# Patient Record
Sex: FEMALE | Race: BLACK OR AFRICAN AMERICAN | NOT HISPANIC OR LATINO | Employment: FULL TIME | URBAN - METROPOLITAN AREA
[De-identification: names, ages, dates, MRNs, and addresses within clinical notes are randomized per-mention and may not be internally consistent; named-entity substitution may affect disease eponyms.]

---

## 2021-06-02 ENCOUNTER — RECORDS - HEALTHEAST (OUTPATIENT)
Dept: ADMINISTRATIVE | Facility: CLINIC | Age: 31
End: 2021-06-02

## 2022-02-03 ENCOUNTER — OFFICE VISIT (OUTPATIENT)
Dept: FAMILY MEDICINE | Facility: CLINIC | Age: 32
End: 2022-02-03

## 2022-02-03 VITALS
DIASTOLIC BLOOD PRESSURE: 79 MMHG | SYSTOLIC BLOOD PRESSURE: 124 MMHG | RESPIRATION RATE: 20 BRPM | HEART RATE: 68 BPM | TEMPERATURE: 98.2 F | OXYGEN SATURATION: 99 %

## 2022-02-03 DIAGNOSIS — J45.901 EXACERBATION OF ASTHMA, UNSPECIFIED ASTHMA SEVERITY, UNSPECIFIED WHETHER PERSISTENT: Primary | ICD-10-CM

## 2022-02-03 DIAGNOSIS — R06.02 SHORTNESS OF BREATH: ICD-10-CM

## 2022-02-03 DIAGNOSIS — R05.9 COUGH: ICD-10-CM

## 2022-02-03 PROCEDURE — U0003 INFECTIOUS AGENT DETECTION BY NUCLEIC ACID (DNA OR RNA); SEVERE ACUTE RESPIRATORY SYNDROME CORONAVIRUS 2 (SARS-COV-2) (CORONAVIRUS DISEASE [COVID-19]), AMPLIFIED PROBE TECHNIQUE, MAKING USE OF HIGH THROUGHPUT TECHNOLOGIES AS DESCRIBED BY CMS-2020-01-R: HCPCS | Mod: 90 | Performed by: NURSE PRACTITIONER

## 2022-02-03 PROCEDURE — 99000 SPECIMEN HANDLING OFFICE-LAB: CPT | Performed by: NURSE PRACTITIONER

## 2022-02-03 PROCEDURE — 99204 OFFICE O/P NEW MOD 45 MIN: CPT | Performed by: NURSE PRACTITIONER

## 2022-02-03 PROCEDURE — U0005 INFEC AGEN DETEC AMPLI PROBE: HCPCS | Mod: 90 | Performed by: NURSE PRACTITIONER

## 2022-02-03 RX ORDER — ALBUTEROL SULFATE 90 UG/1
2 AEROSOL, METERED RESPIRATORY (INHALATION) ONCE
Status: COMPLETED | OUTPATIENT
Start: 2022-02-03 | End: 2022-02-03

## 2022-02-03 RX ORDER — ALBUTEROL SULFATE 90 UG/1
2 AEROSOL, METERED RESPIRATORY (INHALATION) EVERY 6 HOURS PRN
Qty: 18 G | Refills: 0 | Status: SHIPPED | OUTPATIENT
Start: 2022-02-03 | End: 2022-09-26

## 2022-02-03 RX ORDER — PREDNISONE 20 MG/1
40 TABLET ORAL DAILY
Qty: 10 TABLET | Refills: 0 | Status: SHIPPED | OUTPATIENT
Start: 2022-02-03 | End: 2022-02-08

## 2022-02-03 RX ADMIN — ALBUTEROL SULFATE 2 PUFF: 90 INHALANT RESPIRATORY (INHALATION) at 11:37

## 2022-02-03 ASSESSMENT — ENCOUNTER SYMPTOMS
SHORTNESS OF BREATH: 1
WHEEZING: 1
COUGH: 1
CHILLS: 0
MYALGIAS: 0
FEVER: 0
CHEST TIGHTNESS: 1

## 2022-02-03 NOTE — LETTER
[address]  [facility] [address]  Phone: [phone]  Fax: [phone]    February 3, 2022        Luis Martínez  [facility] [address]          To whom it may concern:    RE: Luis Martínez    Patient was seen and treated today at our clinic and missed work.    Please contact me for questions or concerns.      Sincerely,        Marietta Bates, CNP

## 2022-02-03 NOTE — PATIENT INSTRUCTIONS
Recheck in 1-2 weeks at primary care.    Use inhaler up to 4 times daily as needed     If you need it more than 4 times, you probably need prednisone (come back or call).        Patient Education     Using an Inhaler with a Spacer  To control asthma, you need to use your medicines the right way. Some medicines are inhaled using a device called a metered-dose inhaler (MDI). MDIs deliver medicine with a fine spray. Your healthcare provider has prescribed a special chamber or spacer to use with your inhaler. A spacer increases the amount of medicine that goes to your lungs. It may make your medicine work better.   Steps for using an inhaler with a spacer  Step 1:    Wash your hands.    Check the expiration date and the counter of the inhaler, if present.    Remove the cap from the inhaler.    Shake the inhaler well. If the inhaler is being used for the first time or has not been used for a while, prime it as directed by the product maker. Make sure to prime the inhaler in the air away from your face.    Check to make sure the metal canister is put correctly into the plastic boot, or rajan of the inhaler. See the package insert for instructions.    Attach the spacer to the inhaler. Then remove the cap from the spacer mouthpiece.  Step 2:    Breathe out normally, away from the spacer.    Put the mouthpiece of the spacer past your teeth and above your tongue. Close your lips tightly around it. If you are using a spacer with a mask, make sure the mask covers your nose and mouth with a good seal against your chin and cheeks. Make sure there is no space between your skin and the mask.    Keep your chin up or level.  Step 3:    Press the canister on the inhaler 1 time to release the medicine    Then breathe in through your mouth as slowly and deeply as you can. This should take about 5 to 10  seconds. If you breathe too quickly, you may hear a whistling sound in certain spacers.  Step 4:    Take the spacer mouthpiece out of  your mouth. Close your lips.    Hold your breath up to 10 seconds if you are able.    Then hold your lips together and slowly breathe out through your mouth.    After using your inhaler, rinse your mouth with water by swishing, gargling, and spitting out the water. Never swallow it. Inhaled corticosteroids can cause a fungal infection called thrush in your mouth and throat.        If you re prescribed more than 1 puff of medicine at a time, wait up to 60 seconds, or as directed by your healthcare provider, between puffs. This number may be different for different medicines. Shake the inhaler again. Then repeat steps 2 to 4.   Rev[employer] last reviewed this educational content on 5/1/2019 2000-2021 [employer]. All rights reserved. This information is not intended as a substitute for professional medical care. Always follow your healthcare professional's instructions.           Patient Education     Asthma (Adult)   Asthma is a disease where the medium and small air passages in the lung go into spasm and restrict air flow. Inflammation and swelling of the airways cause further blockage. During an acute asthma attack, these factors cause trouble breathing, wheezing, cough, and chest tightness.     An asthma attack can be triggered by many things. Common triggers include infections such as the common cold, bronchitis, and pneumonia. Irritants such as smoke or pollutants in the air, very cold air, emotional upset, and exercise can also trigger an attack. In many adults with asthma, allergies to dust, mold, pollen, and animal dander can cause an asthma attack. Skipping doses of daily asthma medicine can also bring on an asthma attack.   Asthma can be controlled using the correct medicines prescribed by your healthcare provider and staying away from known triggers including allergens and irritants.   Home care    Take prescribed medicine exactly at the times advised. If you need medicine such as from a  handheld inhaler or aerosol breathing machine more than every 4 hours, contact your healthcare provider or get medical care right away. If you are prescribed an antibiotic or prednisone, take all of the medicine as prescribed. Keep taking it even if you are feeling better after a few days.    Don't smoke. Stay away from the smoke of others.    Some people with asthma find their symptoms get worse when they take aspirin and non-steroidal or fever-reducing medicines such as ibuprofen and naproxen. Talk with your healthcare provider if you think this may apply to you.    Follow-up care  Follow up with your healthcare provider, or as advised. Always bring all of your current medicines to any appointments with your healthcare provider. Also bring a complete list of medicines, even those not taken for asthma. If you don't already have one, talk with your healthcare provider about making your own Asthma Action Plan.   A pneumonia (pneumococcal) vaccine and yearly flu shot (every fall) are advised. Ask your provider about this.   When to get medical advice  Call your healthcare provider or get medical care right away if any of these occur:      More wheezing or shortness of breath    Need to use your inhalers more often than normal without relief    Fever of 100.4 F (38 C) or higher, or as directed by your provider    Coughing up lots of dark-colored or bloody sputum (mucus)    Chest pain with each breath    If you use a peak flow meter as part of an Asthma Action Plan, and you are still in the yellow zone (50% to 80%) 15 minutes after using inhaler medicine.  Call 911  Call 911 if any of these occur:     Trouble walking or talking because you are short of breath    If you use a peak flow meter as part of an Asthma Action Plan, and you are still in the red zone (less than 50%) 15 minutes after using inhaler medicine    Lips or fingernails turn gray, purple, or blue    Feeling faint or loss of consciousness  [employer] last  reviewed this educational content on 10/1/2019    6181-9383 [employer]. All rights reserved. This information is not intended as a substitute for professional medical care. Always follow your healthcare professional's instructions.

## 2022-02-03 NOTE — PROGRESS NOTES
Assessment & Plan     Exacerbation of asthma, unspecified asthma severity, unspecified whether persistent    - albuterol (PROVENTIL HFA/VENTOLIN HFA) inhaler  - AEROCHAMBER  - albuterol (PROAIR HFA/PROVENTIL HFA/VENTOLIN HFA) 108 (90 Base) MCG/ACT inhaler  Dispense: 18 g; Refill: 0  - predniSONE (DELTASONE) 20 MG tablet  Dispense: 10 tablet; Refill: 0    Moderate persistent asthma      Cough    - Symptomatic; Yes; 2/1/2022 COVID-19 Virus (Coronavirus) by PCR Nose     Patient with typical asthma exacerbation symptoms.  Was out of her albuterol when this started.  Patient recovered well with 2 albuterol puffs with a spacer given in the clinic.  Taught how to use spacer.  Patient's wheezing had resolved by the time I checked her.  She says she is feeling much better.    Patient does not feel she needs prednisone.  Gave her a paper prescription to fill if she is using her albuterol more than 4 times daily to persistent wheezing.    Has no PCP.  Told to establish care in the next couple of weeks for recheck.  Should come back if worsening despite use of prednisone and albuterol.    Respiratory rate improved from 28-to 20 prior to discharge.            No follow-ups on file.    Marietta Bates [facility] is a 31 year old female who presents to clinic today for the following health issues:  Chief Complaint   Patient presents with     Breathing Problem     Asthma has been triggered and wheezing x 9:30AM today, out of inhaler, coughing is triggered from the asthma     HPI    Coughing, wheezing for 2 days.  Clear phlegm.  No ill contacts.      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      Rec'd albuterol when she arrived - feeling a little better.     Has been out of inhalers x 1 weeks.  Would use albuterol occasionally.     Hasn't used controllers in years.               Review of Systems   Constitutional: Negative for chills and fever.   HENT: Negative for congestion.     Respiratory: Positive for cough, chest tightness, shortness of breath and wheezing.    Musculoskeletal: Negative for myalgias.           Objective    /79 (BP Location: Right arm)   Pulse 68   Temp 98.2  F (36.8  C) (Tympanic)   Resp 20   SpO2 99%   Physical Exam  Constitutional:       General: She is not in acute distress.     Appearance: She is well-developed.   HENT:      Nose: No congestion.   Eyes:      General:         Right eye: No discharge.         Left eye: No discharge.      Conjunctiva/sclera: Conjunctivae normal.   Cardiovascular:      Rate and Rhythm: Normal rate and regular rhythm.      Pulses: Normal pulses.      Heart sounds: Normal heart sounds.   Pulmonary:      Effort: Pulmonary effort is normal.      Breath sounds: Normal breath sounds. No wheezing.   Musculoskeletal:         General: Normal range of motion.   Skin:     General: Skin is warm and dry.      Capillary Refill: Capillary refill takes less than 2 seconds.   Neurological:      Mental Status: She is alert and oriented to person, place, and time.   Psychiatric:         Mood and Affect: Mood normal.         Behavior: Behavior normal.         Thought Content: Thought content normal.         Judgment: Judgment normal.

## 2022-02-04 LAB — SARS-COV-2 RNA RESP QL NAA+PROBE: NOT DETECTED

## 2022-09-26 ENCOUNTER — HOSPITAL ENCOUNTER (EMERGENCY)
Facility: HOSPITAL | Age: 32
Discharge: HOME | End: 2022-09-26
Admitting: PHYSICIAN ASSISTANT

## 2022-09-26 VITALS
DIASTOLIC BLOOD PRESSURE: 57 MMHG | TEMPERATURE: 98.3 F | WEIGHT: 199 LBS | RESPIRATION RATE: 28 BRPM | OXYGEN SATURATION: 100 % | SYSTOLIC BLOOD PRESSURE: 117 MMHG | HEART RATE: 84 BPM | BODY MASS INDEX: 34.16 KG/M2

## 2022-09-26 DIAGNOSIS — J45.901 EXACERBATION OF ASTHMA, UNSPECIFIED ASTHMA SEVERITY, UNSPECIFIED WHETHER PERSISTENT: ICD-10-CM

## 2022-09-26 DIAGNOSIS — J20.9 ACUTE BRONCHITIS: ICD-10-CM

## 2022-09-26 LAB
FLUAV RNA SPEC QL NAA+PROBE: NEGATIVE
FLUBV RNA RESP QL NAA+PROBE: NEGATIVE
RSV RNA SPEC NAA+PROBE: POSITIVE
SARS-COV-2 RNA RESP QL NAA+PROBE: NEGATIVE

## 2022-09-26 PROCEDURE — 99284 EMERGENCY DEPT VISIT MOD MDM: CPT | Mod: 25

## 2022-09-26 PROCEDURE — C9803 HOPD COVID-19 SPEC COLLECT: HCPCS

## 2022-09-26 PROCEDURE — 250N000009 HC RX 250: Performed by: PHYSICIAN ASSISTANT

## 2022-09-26 PROCEDURE — 999N000157 HC STATISTIC RCP TIME EA 10 MIN

## 2022-09-26 PROCEDURE — 87637 SARSCOV2&INF A&B&RSV AMP PRB: CPT | Performed by: EMERGENCY MEDICINE

## 2022-09-26 PROCEDURE — 94640 AIRWAY INHALATION TREATMENT: CPT

## 2022-09-26 RX ORDER — ALBUTEROL SULFATE 90 UG/1
2 AEROSOL, METERED RESPIRATORY (INHALATION) EVERY 6 HOURS PRN
Qty: 18 G | Refills: 1 | Status: SHIPPED | OUTPATIENT
Start: 2022-09-26

## 2022-09-26 RX ORDER — IPRATROPIUM BROMIDE AND ALBUTEROL SULFATE 2.5; .5 MG/3ML; MG/3ML
1 SOLUTION RESPIRATORY (INHALATION) EVERY 6 HOURS PRN
Qty: 90 ML | Refills: 0 | Status: SHIPPED | OUTPATIENT
Start: 2022-09-26

## 2022-09-26 RX ORDER — IPRATROPIUM BROMIDE AND ALBUTEROL SULFATE 2.5; .5 MG/3ML; MG/3ML
3 SOLUTION RESPIRATORY (INHALATION) ONCE
Status: COMPLETED | OUTPATIENT
Start: 2022-09-26 | End: 2022-09-26

## 2022-09-26 RX ORDER — PREDNISONE 20 MG/1
TABLET ORAL
Qty: 10 TABLET | Refills: 0 | Status: SHIPPED | OUTPATIENT
Start: 2022-09-26

## 2022-09-26 RX ADMIN — IPRATROPIUM BROMIDE AND ALBUTEROL SULFATE 3 ML: .5; 3 SOLUTION RESPIRATORY (INHALATION) at 10:52

## 2022-09-26 ASSESSMENT — ENCOUNTER SYMPTOMS
FEVER: 0
CHILLS: 0
ABDOMINAL PAIN: 0
COUGH: 1
SHORTNESS OF BREATH: 1

## 2022-09-26 ASSESSMENT — ACTIVITIES OF DAILY LIVING (ADL): ADLS_ACUITY_SCORE: 35

## 2022-09-26 NOTE — Clinical Note
Luis Martínez was seen and treated in our emergency department on 9/26/2022.  She may return to work on 09/27/2022.       If you have any questions or concerns, please don't hesitate to call.      Chadwick Melgoza PA-C

## 2022-09-26 NOTE — ED TRIAGE NOTES
Patient presents to ED with 4 days of shortness of breath, wheezing and chest pain when she coughs. Does not have access to her rescue inhaler and ran out of her nebulizer med. Hx of asthma.    Audible wheezes on arrival.

## 2022-09-26 NOTE — ED PROVIDER NOTES
EMERGENCY DEPARTMENT ENCOUNTER      NAME: Luis Martínez  AGE: 32 year old female  YOB: 1990  MRN: [id number]  EVALUATION DATE & TIME: No admission date for patient encounter.    PCP: No Ref-Primary, Physician    ED PROVIDER: Chadwick Melgoza PA-C      Chief Complaint   Patient presents with     Shortness of Breath     Wheezing         FINAL IMPRESSION:  1. Acute bronchitis    2. Exacerbation of asthma, unspecified asthma severity, unspecified whether persistent          MEDICAL DECISION MAKING:    Pertinent Labs & Imaging studies reviewed. (See chart for details)  32 year old female presents to the Emergency Department for evaluation of cough and shortness of breath with history of asthma and smoking.    After obtaining history present illness, reviewing vital signs and examining the patient I suspect acute bronchitis as a likely diagnosis.    Prior to discharging the patient nursing staff with paperwork informing that she was having more wheezing.  I did reexamine the patient and now on during my physical examination there is expiratory wheezes noted in all lung fields.  Patient continues to appear nontoxic and in no significant distress, we will treat with a neb treatment prior to disposition but then continue with our plan of care of steroids, refill of her inhaler, and refill of her neb solution for home treatment.    Wheezing completely cleared after neb treatment here, patient is prepared to discharged home.      ED COURSE  9:59 AM  I met with the patient, obtained history, performed an initial exam, and discussed options and plan for diagnostics and treatment here in the ED.  11:19 AM I reassessed patient.    At the conclusion of the encounter I discussed the results of all of the tests and the disposition. The questions were answered. The patient or family acknowledged understanding and was agreeable with the care plan.     MEDICATIONS GIVEN IN THE EMERGENCY:  Medications   ipratropium -  albuterol 0.5 mg/2.5 mg/3 mL (DUONEB) neb solution 3 mL (3 mLs Nebulization Given 9/26/22 1052)       NEW PRESCRIPTIONS STARTED AT TODAY'S ER VISIT  New Prescriptions    IPRATROPIUM - ALBUTEROL 0.5 MG/2.5 MG/3 ML (DUONEB) 0.5-2.5 (3) MG/3ML NEB SOLUTION    Take 1 vial (3 mLs) by nebulization every 6 hours as needed for shortness of breath / dyspnea or wheezing    PREDNISONE (DELTASONE) 20 MG TABLET    Take two tablets (= 40mg) each day for 5 (five) days            =================================================================    HPI    Patient information was obtained from: Patient    Use of Interpretor: N/A       Luis NEGRO Mauricio is a 32 year old female with a pertinent history of asthma who presents to this ED via walk-in for evaluation of shortness of breath, wheezing.    Patient endorses ongoing shortness of breath and a non productive cough for 4 days, she also states she ran out of her rescue inhaler 4 days ago. Patient is a smoker, but has not smoked for the last 4 days. Patient denies any history of blood clots or clotting disorders. Patient works at a  and requests a covid test for work.     Patient denies recent trauma, denies fever, chills, abdominal pain, chest pain, and all other relevant symptoms.      REVIEW OF SYSTEMS   Review of Systems   Constitutional: Negative for chills and fever.   Respiratory: Positive for cough (Non productive) and shortness of breath.    Cardiovascular: Negative for chest pain.   Gastrointestinal: Negative for abdominal pain.   All other systems reviewed and are negative.         PAST MEDICAL HISTORY:  Past Medical History:   Diagnosis Date     Uncomplicated asthma        PAST SURGICAL HISTORY:  History reviewed. No pertinent surgical history.      CURRENT MEDICATIONS:    No current facility-administered medications for this encounter.    Current Outpatient Medications:      albuterol (PROAIR HFA/PROVENTIL HFA/VENTOLIN HFA) 108 (90 Base) MCG/ACT inhaler, Inhale 2  puffs into the lungs every 6 hours as needed for shortness of breath / dyspnea or wheezing, Disp: 18 g, Rfl: 1     ipratropium - albuterol 0.5 mg/2.5 mg/3 mL (DUONEB) 0.5-2.5 (3) MG/3ML neb solution, Take 1 vial (3 mLs) by nebulization every 6 hours as needed for shortness of breath / dyspnea or wheezing, Disp: 90 mL, Rfl: 0     predniSONE (DELTASONE) 20 MG tablet, Take two tablets (= 40mg) each day for 5 (five) days, Disp: 10 tablet, Rfl: 0     beclomethasone (QVAR) 40 mcg/actuation inhaler, [BECLOMETHASONE (QVAR) 40 MCG/ACTUATION INHALER] Inhale 1 puff 2 (two) times a day. (Patient not taking: Reported on 2/3/2022), Disp: 1 Inhaler, Rfl: 12     fluticasone-salmeterol (ADVAIR) 100-50 mcg/dose DISKUS, [FLUTICASONE-SALMETEROL (ADVAIR) 100-50 MCG/DOSE DISKUS] Inhale 1 puff 2 (two) times a day. (Patient not taking: Reported on 2/3/2022), Disp: 1 each, Rfl: 1     ibuprofen (ADVIL,MOTRIN) 600 MG tablet, [IBUPROFEN (ADVIL,MOTRIN) 600 MG TABLET] Take 1 tablet (600 mg total) by mouth every 6 (six) hours as needed. (Patient not taking: Reported on 2/3/2022), Disp: 28 tablet, Rfl: 0      ALLERGIES:  No Known Allergies    FAMILY HISTORY:  Family History   Problem Relation Age of Onset     No Known Problems Mother      No Known Problems Father      No Known Problems Brother      Diabetes Maternal Grandmother      Heart Disease Maternal Grandmother      Hypertension Maternal Grandmother      Cerebrovascular Disease Paternal Grandmother        SOCIAL HISTORY:   Social History     Socioeconomic History     Marital status: Single   Tobacco Use     Smoking status: Never Smoker     Smokeless tobacco: Never Used   Substance and Sexual Activity     Alcohol use: Yes     Comment: Alcoholic Drinks/day: occ       Drug use: No     Sexual activity: Yes     Partners: Male       VITALS:  Patient Vitals for the past 24 hrs:   BP Temp Temp src Pulse Resp SpO2 Weight   09/26/22 0936 117/57 98.3  F (36.8  C) Temporal 84 28 100 % 90.3 kg (199 lb)        PHYSICAL EXAM    Physical Exam  Vitals and nursing note reviewed.   Constitutional:       General: She is not in acute distress.     Appearance: She is normal weight. She is not ill-appearing or toxic-appearing.   HENT:      Head: Normocephalic.      Right Ear: External ear normal.      Left Ear: External ear normal.   Eyes:      Conjunctiva/sclera: Conjunctivae normal.   Cardiovascular:      Rate and Rhythm: Normal rate.      Pulses: Normal pulses.   Pulmonary:      Effort: Pulmonary effort is normal. No respiratory distress.      Breath sounds: No rhonchi or rales.      Comments: Original examination did not reveal any significant wheezing, clear lung sounds noted in all lung fields.    Prior to disposition, nursing staff reported that she was seeming to show more evidence of wheezing.  I did reexamine the patient now she does have diffuse expiratory wheezes noted throughout but overall still moving air well and not appearing in significant respiratory distress.  Musculoskeletal:         General: Normal range of motion.      Cervical back: Normal range of motion.      Right lower leg: No edema.      Left lower leg: No edema.   Skin:     General: Skin is dry.   Neurological:      General: No focal deficit present.      Mental Status: She is alert. Mental status is at baseline.      Sensory: No sensory deficit.      Motor: No weakness.          LAB:  All pertinent labs reviewed and interpreted.       RADIOLOGY:  Reviewed all pertinent imaging. Please see official radiology report.  No orders to display         Tru MCGRATH, am serving as a scribe to document services personally performed by Chadwick Melgoza PA-C based on my observation and the provider's statements to me. IChadwick PA-C attest that Tru Rodriguez is acting in a scribe capacity, has observed my performance of the services and has documented them in accordance with my direction.    Chadwick Melgoza PA-C  Emergency Medicine  [facility]       Chadwick Melgoza PA-C  09/26/22 1125

## 2022-09-26 NOTE — DISCHARGE INSTRUCTIONS
At this point time your vital signs are all normal.  Pulmonary examination is benign.  I suspect acute bronchitis as a cause of your shortness of breath and chest discomfort and cough.  I did not feel that emergent work-up was needed in the form of labs or imaging, because of your working at a  we will screen with COVID testing, and these results can be followed on MyChart.

## 2024-09-03 PROCEDURE — 99284 EMERGENCY DEPT VISIT MOD MDM: CPT | Mod: 25

## 2024-09-03 ASSESSMENT — COLUMBIA-SUICIDE SEVERITY RATING SCALE - C-SSRS
6. HAVE YOU EVER DONE ANYTHING, STARTED TO DO ANYTHING, OR PREPARED TO DO ANYTHING TO END YOUR LIFE?: NO
1. IN THE PAST MONTH, HAVE YOU WISHED YOU WERE DEAD OR WISHED YOU COULD GO TO SLEEP AND NOT WAKE UP?: NO
2. HAVE YOU ACTUALLY HAD ANY THOUGHTS OF KILLING YOURSELF IN THE PAST MONTH?: NO

## 2024-09-04 ENCOUNTER — HOSPITAL ENCOUNTER (EMERGENCY)
Facility: CLINIC | Age: 34
Discharge: HOME | End: 2024-09-04
Attending: EMERGENCY MEDICINE | Admitting: EMERGENCY MEDICINE

## 2024-09-04 ENCOUNTER — APPOINTMENT (OUTPATIENT)
Dept: CT IMAGING | Facility: CLINIC | Age: 34
End: 2024-09-04
Attending: EMERGENCY MEDICINE

## 2024-09-04 VITALS
HEIGHT: 64 IN | BODY MASS INDEX: 37.08 KG/M2 | TEMPERATURE: 98.2 F | WEIGHT: 217.2 LBS | HEART RATE: 75 BPM | RESPIRATION RATE: 18 BRPM | OXYGEN SATURATION: 98 % | SYSTOLIC BLOOD PRESSURE: 122 MMHG | DIASTOLIC BLOOD PRESSURE: 82 MMHG

## 2024-09-04 DIAGNOSIS — L03.90 CELLULITIS, UNSPECIFIED CELLULITIS SITE: ICD-10-CM

## 2024-09-04 DIAGNOSIS — R10.9 ABDOMINAL PAIN, UNSPECIFIED ABDOMINAL LOCATION: ICD-10-CM

## 2024-09-04 LAB
ALBUMIN SERPL BCG-MCNC: 4 G/DL (ref 3.5–5.2)
ALBUMIN UR-MCNC: NEGATIVE MG/DL
ALP SERPL-CCNC: 55 U/L (ref 40–150)
ALT SERPL W P-5'-P-CCNC: 7 U/L (ref 0–50)
ANION GAP SERPL CALCULATED.3IONS-SCNC: 12 MMOL/L (ref 7–15)
APPEARANCE UR: CLEAR
AST SERPL W P-5'-P-CCNC: 16 U/L (ref 0–45)
BACTERIA #/AREA URNS HPF: ABNORMAL /HPF
BASOPHILS # BLD AUTO: 0 10E3/UL (ref 0–0.2)
BASOPHILS NFR BLD AUTO: 1 %
BILIRUB SERPL-MCNC: <0.2 MG/DL
BILIRUB UR QL STRIP: NEGATIVE
BUN SERPL-MCNC: 7.5 MG/DL (ref 6–20)
CALCIUM SERPL-MCNC: 8.6 MG/DL (ref 8.8–10.4)
CHLORIDE SERPL-SCNC: 106 MMOL/L (ref 98–107)
COLOR UR AUTO: ABNORMAL
CREAT SERPL-MCNC: 0.93 MG/DL (ref 0.51–0.95)
EGFRCR SERPLBLD CKD-EPI 2021: 82 ML/MIN/1.73M2
EOSINOPHIL # BLD AUTO: 0.3 10E3/UL (ref 0–0.7)
EOSINOPHIL NFR BLD AUTO: 5 %
ERYTHROCYTE [DISTWIDTH] IN BLOOD BY AUTOMATED COUNT: 13.2 % (ref 10–15)
GLUCOSE SERPL-MCNC: 88 MG/DL (ref 70–99)
GLUCOSE UR STRIP-MCNC: NEGATIVE MG/DL
HCG SERPL QL: NEGATIVE
HCO3 SERPL-SCNC: 26 MMOL/L (ref 22–29)
HCT VFR BLD AUTO: 37.9 % (ref 35–47)
HGB BLD-MCNC: 12.5 G/DL (ref 11.7–15.7)
HGB UR QL STRIP: ABNORMAL
IMM GRANULOCYTES # BLD: 0 10E3/UL
IMM GRANULOCYTES NFR BLD: 0 %
KETONES UR STRIP-MCNC: NEGATIVE MG/DL
LEUKOCYTE ESTERASE UR QL STRIP: NEGATIVE
LIPASE SERPL-CCNC: 75 U/L (ref 13–60)
LYMPHOCYTES # BLD AUTO: 2.8 10E3/UL (ref 0.8–5.3)
LYMPHOCYTES NFR BLD AUTO: 49 %
MCH RBC QN AUTO: 29.5 PG (ref 26.5–33)
MCHC RBC AUTO-ENTMCNC: 33 G/DL (ref 31.5–36.5)
MCV RBC AUTO: 89 FL (ref 78–100)
MONOCYTES # BLD AUTO: 0.6 10E3/UL (ref 0–1.3)
MONOCYTES NFR BLD AUTO: 10 %
MUCOUS THREADS #/AREA URNS LPF: PRESENT /LPF
NEUTROPHILS # BLD AUTO: 2 10E3/UL (ref 1.6–8.3)
NEUTROPHILS NFR BLD AUTO: 36 %
NITRATE UR QL: POSITIVE
NRBC # BLD AUTO: 0 10E3/UL
NRBC BLD AUTO-RTO: 0 /100
PH UR STRIP: 5.5 [PH] (ref 5–7)
PLAT MORPH BLD: NORMAL
PLATELET # BLD AUTO: 292 10E3/UL (ref 150–450)
POTASSIUM SERPL-SCNC: 4.1 MMOL/L (ref 3.4–5.3)
PROT SERPL-MCNC: 6.7 G/DL (ref 6.4–8.3)
RBC # BLD AUTO: 4.24 10E6/UL (ref 3.8–5.2)
RBC MORPH BLD: NORMAL
RBC URINE: 1 /HPF
SODIUM SERPL-SCNC: 144 MMOL/L (ref 135–145)
SP GR UR STRIP: 1.01 (ref 1–1.03)
SQUAMOUS EPITHELIAL: 1 /HPF
UROBILINOGEN UR STRIP-MCNC: <2 MG/DL
WBC # BLD AUTO: 5.7 10E3/UL (ref 4–11)
WBC URINE: 2 /HPF

## 2024-09-04 PROCEDURE — 81001 URINALYSIS AUTO W/SCOPE: CPT | Performed by: EMERGENCY MEDICINE

## 2024-09-04 PROCEDURE — 87086 URINE CULTURE/COLONY COUNT: CPT | Performed by: EMERGENCY MEDICINE

## 2024-09-04 PROCEDURE — 84703 CHORIONIC GONADOTROPIN ASSAY: CPT | Performed by: EMERGENCY MEDICINE

## 2024-09-04 PROCEDURE — 87186 SC STD MICRODIL/AGAR DIL: CPT | Performed by: EMERGENCY MEDICINE

## 2024-09-04 PROCEDURE — 83690 ASSAY OF LIPASE: CPT | Performed by: EMERGENCY MEDICINE

## 2024-09-04 PROCEDURE — 80053 COMPREHEN METABOLIC PANEL: CPT | Performed by: EMERGENCY MEDICINE

## 2024-09-04 PROCEDURE — 85025 COMPLETE CBC W/AUTO DIFF WBC: CPT | Performed by: EMERGENCY MEDICINE

## 2024-09-04 PROCEDURE — 36415 COLL VENOUS BLD VENIPUNCTURE: CPT | Performed by: EMERGENCY MEDICINE

## 2024-09-04 PROCEDURE — 74176 CT ABD & PELVIS W/O CONTRAST: CPT

## 2024-09-04 RX ORDER — OXYCODONE AND ACETAMINOPHEN 5; 325 MG/1; MG/1
1 TABLET ORAL EVERY 6 HOURS PRN
Qty: 8 TABLET | Refills: 0 | Status: SHIPPED | OUTPATIENT
Start: 2024-09-04 | End: 2024-09-07

## 2024-09-04 ASSESSMENT — ACTIVITIES OF DAILY LIVING (ADL)
ADLS_ACUITY_SCORE: 35

## 2024-09-04 NOTE — Clinical Note
Luis Martínez was seen and treated in our emergency department on 9/3/2024.  She may return to work on 09/05/2024.       If you have any questions or concerns, please don't hesitate to call.      Morteza Amado MD

## 2024-09-04 NOTE — DISCHARGE INSTRUCTIONS
Ice off-and-on to sore areas can help with pain.  Over-the-counter Tylenol or ibuprofen as tolerated every 6 hours can help with pain.  1 Percocet every 6 hours instead only if needed for stronger pain.  Augmentin as prescribed.  Wash leg area with soap and water twice daily.  See your doctor or clinic for follow-up within the next week.  See them sooner if worse or problems.

## 2024-09-04 NOTE — ED PROVIDER NOTES
EMERGENCY DEPARTMENT ENCOUNTER      NAME: Luis Martínez  AGE: 34 year old female  YOB: 1990  MRN: [id number]  EVALUATION DATE & TIME: 2024  1:30 AM    PCP: No Ref-Primary, Physician    ED PROVIDER: Morteza Amado M.D.      Chief Complaint   Patient presents with    Abdominal Pain    Insect Bite                FINAL IMPRESSION:  1.  Acute abdominal pain.  2.  Acute left thigh bug bite cellulitis.      ED COURSE & MEDICAL DECISION MAKIN:22 AM I met with the patient to gather history and to perform my initial exam. We discussed plans for the ED course, including diagnostic testing and treatment. PPE worn: cloth mask.  Patient with diffuse abdominal pain off-and-on for a number of months increased over this weekend.  Pain is diffuse.  She also notes some sort of a bug bite, unwitnessed, to the left lateral thigh and now has a 2 inch diameter red and warm to the touch which is slightly itchy.  No central clearing.  Findings consistent with a bug bite cellulitis.  5:32 AM.  No acute findings in abdomen CT.  Negative urine pregnancy test.  Negative CBC and chemistries and LFTs.  Lipase mildly elevated but the pancreas is normal on CT.  Patient will be discharged home on pain medications and antibiotic for her bug bite cellulitis.  Patient in agreement with the plan.    Pertinent Labs & Imaging studies reviewed. (See chart for details)  34 year old female presents to the Emergency Department for evaluation of abdominal pain and bug bite cellulitis.    At the conclusion of the encounter I discussed the results of all of the tests and the disposition. The questions were answered. The patient or family acknowledged understanding and was agreeable with the care plan.              Medical Decision Making  Reviewed external records: Both inpatient and outpatient computer records were reviewed.  Care impacted by chronic illness: Asthma, urinary tract infection  Care significantly affected by social  determinants of health:Access to Medical Care  Did you consider but not order tests?: Work up considered but not performed and documented in chart, if applicable  Did you interpret images independently?: Independent interpretation of ECG and images noted in documentation, when applicable.  Consultation discussion with other provider:Did you involve another provider (consultant, MH, pharmacy, etc.)?: No  Anticipated discharge home after evaluation.          MEDICATIONS GIVEN IN THE EMERGENCY:  Medications - No data to display    NEW PRESCRIPTIONS STARTED AT TODAY'S ER VISIT  New Prescriptions    No medications on file          =================================================================    HPI    Patient information was obtained from: The patient.    Use of : N/A       Luis SRUTHI Martínez is a 34 year old female with a pertinent history of asthma who presents to this ED today for evaluation of diffuse abdominal pain off and on for a number of months.  Worse over the weekend.  Additionally some sort of a bug bite to the left lateral thigh.  She not certain what insect it was that she now has a 2 inch diameter area of redness and itching.  No central clearing.    She does not identify any waxing or waning symptoms otherwise, exacerbating or alleviating features, associated symptoms except as mentioned.  She otherwise denies any pain related complaints.    REVIEW OF SYSTEMS   Review of Systems patient complaining of left thigh cellulitis and abdominal pain.  No other complaints or symptoms at this time other than mild nausea.    PAST MEDICAL HISTORY:  Past Medical History:   Diagnosis Date    Uncomplicated asthma        PAST SURGICAL HISTORY:  History reviewed. No pertinent surgical history.        CURRENT MEDICATIONS:    albuterol (PROAIR HFA/PROVENTIL HFA/VENTOLIN HFA) 108 (90 Base) MCG/ACT inhaler  beclomethasone (QVAR) 40 mcg/actuation inhaler  fluticasone-salmeterol (ADVAIR) 100-50 mcg/dose  "DISKUS  ibuprofen (ADVIL,MOTRIN) 600 MG tablet  ipratropium - albuterol 0.5 mg/2.5 mg/3 mL (DUONEB) 0.5-2.5 (3) MG/3ML neb solution  predniSONE (DELTASONE) 20 MG tablet        ALLERGIES:  No Known Allergies    FAMILY HISTORY:  Family History   Problem Relation Age of Onset    No Known Problems Mother     No Known Problems Father     No Known Problems Brother     Diabetes Maternal Grandmother     Heart Disease Maternal Grandmother     Hypertension Maternal Grandmother     Cerebrovascular Disease Paternal Grandmother        SOCIAL HISTORY:   Social History     Socioeconomic History    Marital status: Single     Spouse name: None    Number of children: None    Years of education: None    Highest education level: None   Tobacco Use    Smoking status: Never    Smokeless tobacco: Never   Substance and Sexual Activity    Alcohol use: Yes     Comment: Alcoholic Drinks/day: occ      Drug use: No    Sexual activity: Yes     Partners: Male     Occasional alcohol.  No drugs or tobacco.  VITALS:  /82   Pulse 64   Temp 98.2  F (36.8  C) (Temporal)   Resp 18   Ht 1.626 m (5' 4\")   Wt 98.5 kg (217 lb 3.2 oz)   LMP 08/19/2024 (Approximate)   SpO2 98%   BMI 37.28 kg/m      PHYSICAL EXAM    Vital Signs:  /82   Pulse 64   Temp 98.2  F (36.8  C) (Temporal)   Resp 18   Ht 1.626 m (5' 4\")   Wt 98.5 kg (217 lb 3.2 oz)   LMP 08/19/2024 (Approximate)   SpO2 98%   BMI 37.28 kg/m    General:  On entering the room she is in no apparent distress.    Neck:  Neck supple with full range of motion and nontender.    Back:  Back and spine are nontender.  No costovertebral angle tenderness.    HEENT:  Oropharynx clear with moist mucous membranes.  HEENT unremarkable.    Pulmonary:  Chest clear to auscultation without rhonchi rales or wheezing.    Cardiovascular:  Cardiac regular rate and rhythm without murmurs rubs or gallops.    Abdomen:  Abdomen soft and mildly diffusely tender.  There is no rebound or guarding.  "   Muskuloskeletal:  She moves all 4 without any difficulty and has normal neurovascular exams.  Extremities without clubbing, cyanosis, or edema.  Legs and calves are nontender.    Neuro:  She is alert and oriented ×3 and moves all extremities symmetrically.    Psych:  Normal affect.    Skin:  Unremarkable and warm and dry.  2 inch diameter redness and minimal tenderness to the lateral left thigh.  No central clearing.  No abscess.       LAB:  All pertinent labs reviewed and interpreted.  Labs Ordered and Resulted from Time of ED Arrival to Time of ED Departure   ROUTINE UA WITH MICROSCOPIC REFLEX TO CULTURE - Abnormal       Result Value    Color Urine Light Yellow      Appearance Urine Clear      Glucose Urine Negative      Bilirubin Urine Negative      Ketones Urine Negative      Specific Gravity Urine 1.010      Blood Urine 0.06 mg/dL (*)     pH Urine 5.5      Protein Albumin Urine Negative      Urobilinogen Urine <2.0      Nitrite Urine Positive (*)     Leukocyte Esterase Urine Negative      Bacteria Urine Many (*)     Mucus Urine Present (*)     RBC Urine 1      WBC Urine 2      Squamous Epithelials Urine 1     COMPREHENSIVE METABOLIC PANEL - Abnormal    Sodium 144      Potassium 4.1      Carbon Dioxide (CO2) 26      Anion Gap 12      Urea Nitrogen 7.5      Creatinine 0.93      GFR Estimate 82      Calcium 8.6 (*)     Chloride 106      Glucose 88      Alkaline Phosphatase 55      AST 16      ALT 7      Protein Total 6.7      Albumin 4.0      Bilirubin Total <0.2     LIPASE - Abnormal    Lipase 75 (*)    HCG QUALITATIVE PREGNANCY - Normal    hCG Serum Qualitative Negative     CBC WITH PLATELETS AND DIFFERENTIAL    WBC Count 5.7      RBC Count 4.24      Hemoglobin 12.5      Hematocrit 37.9      MCV 89      MCH 29.5      MCHC 33.0      RDW 13.2      Platelet Count 292      NRBCs per 100 WBC 0      Absolute NRBCs 0.0     RBC AND PLATELET MORPHOLOGY   URINE CULTURE       RADIOLOGY:  Reviewed all pertinent imaging.  Please see official radiology report.  Abd/pelvis CT no contrast - Stone Protocol   Final Result   IMPRESSION:    1.  No acute abnormality. No cause of pain is evident.                    EKG:          PROCEDURES:         I, Elaine Mcallister, am serving as a scribe to document services personally performed by Dr. Amado based on my observation and the provider's statements to me. I, Morteza Amado MD attest that Elaine Mcallister is acting in a scribe capacity, has observed my performance of the services and has documented them in accordance with my direction.    Morteza Amado M.D.  Emergency Medicine  [facility] EMERGENCY ROOM  [facility] [facility] [id number] [phone]  Dept: [phone]       Morteza Amado MD  09/04/24 0532

## 2024-09-04 NOTE — ED TRIAGE NOTES
Patient arrives to the ER with c/o abdominal pain. She has been having abdominal pain for the past few months, but this past weekend, the pain worsened to the point where she was not able to get out of bed. Patient states that she had a bowel movement and she described it as mucous-like. Also endorses nausea. She also was bit by a bug yesterday on her left outer thigh and today she noticed it red and swollen.      Triage Assessment (Adult)       Row Name 09/03/24 2507          Triage Assessment    Airway WDL WDL        Respiratory WDL    Respiratory WDL WDL        Skin Circulation/Temperature WDL    Skin Circulation/Temperature WDL X  bug bite yesterday, bullseye redness to L outer thigh        Cardiac WDL    Cardiac WDL WDL        Peripheral/Neurovascular WDL    Peripheral Neurovascular WDL WDL        Cognitive/Neuro/Behavioral WDL    Cognitive/Neuro/Behavioral WDL WDL

## 2024-09-05 LAB — BACTERIA UR CULT: ABNORMAL

## 2024-09-06 ENCOUNTER — TELEPHONE (OUTPATIENT)
Dept: NURSING | Facility: CLINIC | Age: 34
End: 2024-09-06

## 2024-09-06 RX ORDER — CEPHALEXIN 500 MG/1
500 CAPSULE ORAL 4 TIMES DAILY
Qty: 28 CAPSULE | Refills: 0 | Status: SHIPPED | OUTPATIENT
Start: 2024-09-06 | End: 2024-09-13

## 2024-09-06 NOTE — ED NOTES
10:31 AM I spoke with the results nurse regarding positive urine culture. Upon chart review, patient was prescribed Augmentin for suspected cellulitis. The urine culture showed the bacteria was resistant to Augmentin and is requesting a change in antibiotic. I will prescribe the patient Keflex as the urine culture showed the bacteria was susceptible to this and will also help with the cellulitis. Keflex was sent to patient's pharmacy.     Jacki Kuhn PA-C  09/06/24 1030

## 2024-09-06 NOTE — TELEPHONE ENCOUNTER
[facility]     Reason for call: Lab Result Notification     Lab Result (including Rx patient on, if applicable).  If culture, copy of lab report at bottom.  Lab Result: Urine culture  amoxicillin-clavulanate (AUGMENTIN) 875-125 MG tablet 2 times daily for 7 days for cellulitis  Creatinine Level (mg/dl)   Creatinine   Date Value Ref Range Status   09/04/2024 0.93 0.51 - 0.95 mg/dL Final    Creatinine clearance (ml/min), if applicable    Serum creatinine: 0.93 mg/dL 09/04/24 0306  Estimated creatinine clearance: 97.2 mL/min     Patient's current Symptoms:   Reports right flank pain and bad smelling urine. ,     RN Recommendations/Instructions per [place] ED lab result protocol:   RN will consult with ED provider.    [facility] Emergency Department Provider: Jacki DAI  Consultation Time: 4226  Provider Recommendation:  Stop Augmentin and start Keflex  Patient/parent notified of Providers recommendations (YES/NO): yes  Patient/care giver notified to contact your PCP clinic or return to the Emergency department if your:  Symptoms return.  Symptoms do not improve after 3 days on antibiotic.  Symptoms do not resolve after completing antibiotic.  Symptoms worsen or other concerning symptoms.    Marietta Baltazar, RN

## 2024-09-16 ENCOUNTER — APPOINTMENT (OUTPATIENT)
Dept: ADMINISTRATIVE | Facility: CLINIC | Age: 34
End: 2024-09-16

## 2024-09-18 ENCOUNTER — HOSPITAL ENCOUNTER (EMERGENCY)
Facility: CLINIC | Age: 34
Discharge: HOME | End: 2024-09-18
Attending: FAMILY MEDICINE | Admitting: FAMILY MEDICINE

## 2024-09-18 VITALS
WEIGHT: 217 LBS | HEART RATE: 73 BPM | SYSTOLIC BLOOD PRESSURE: 131 MMHG | TEMPERATURE: 98.4 F | RESPIRATION RATE: 16 BRPM | OXYGEN SATURATION: 98 % | BODY MASS INDEX: 37.25 KG/M2 | DIASTOLIC BLOOD PRESSURE: 74 MMHG

## 2024-09-18 DIAGNOSIS — J45.20 MILD INTERMITTENT ASTHMA WITHOUT COMPLICATION: ICD-10-CM

## 2024-09-18 DIAGNOSIS — R06.09 DOE (DYSPNEA ON EXERTION): ICD-10-CM

## 2024-09-18 PROCEDURE — 99283 EMERGENCY DEPT VISIT LOW MDM: CPT

## 2024-09-18 PROCEDURE — 250N000013 HC RX MED GY IP 250 OP 250 PS 637: Performed by: STUDENT IN AN ORGANIZED HEALTH CARE EDUCATION/TRAINING PROGRAM

## 2024-09-18 RX ORDER — ALBUTEROL SULFATE 90 UG/1
2 AEROSOL, METERED RESPIRATORY (INHALATION) ONCE
Status: COMPLETED | OUTPATIENT
Start: 2024-09-18 | End: 2024-09-18

## 2024-09-18 RX ORDER — ALBUTEROL SULFATE 5 MG/ML
2.5 SOLUTION RESPIRATORY (INHALATION) EVERY 6 HOURS PRN
Status: DISCONTINUED | OUTPATIENT
Start: 2024-09-18 | End: 2024-09-18

## 2024-09-18 RX ORDER — ALBUTEROL SULFATE 90 UG/1
2 AEROSOL, METERED RESPIRATORY (INHALATION) EVERY 6 HOURS PRN
Qty: 18 G | Refills: 0 | Status: SHIPPED | OUTPATIENT
Start: 2024-09-18

## 2024-09-18 RX ADMIN — ALBUTEROL SULFATE 2 PUFF: 90 AEROSOL, METERED RESPIRATORY (INHALATION) at 12:47

## 2024-09-18 ASSESSMENT — COLUMBIA-SUICIDE SEVERITY RATING SCALE - C-SSRS
2. HAVE YOU ACTUALLY HAD ANY THOUGHTS OF KILLING YOURSELF IN THE PAST MONTH?: NO
6. HAVE YOU EVER DONE ANYTHING, STARTED TO DO ANYTHING, OR PREPARED TO DO ANYTHING TO END YOUR LIFE?: NO
1. IN THE PAST MONTH, HAVE YOU WISHED YOU WERE DEAD OR WISHED YOU COULD GO TO SLEEP AND NOT WAKE UP?: NO

## 2024-09-18 ASSESSMENT — ACTIVITIES OF DAILY LIVING (ADL)
ADLS_ACUITY_SCORE: 35

## 2024-09-18 NOTE — ED PROVIDER NOTES
Emergency Department Midlevel Supervisory Note     I had a face to face encounter with this patient seen by the Advanced Practice Provider (ALFRED). I personally made/approved the management plan and take responsibility for the patient management. I personally saw patient and performed a substantive portion of the visit including all aspects of the medical decision making.     ED Course:  12:17 PM  Huma Chris PA-C staffed patient with me. I agree with their assessment and plan of management, and I will see the patient.  12:21 PM  I met with the patient to introduce myself, gather additional history, perform my initial exam, and discuss the plan.     Procedures:  I was present for the key portions of procedures documented in ALFRED/midlevel note, see midlevel note for further details.    MDM:  Patient with history of asthma, had some shortness of breath and wheezing today and was sent to the emergency department.  On exam here, denies any symptoms.  No chest pain.  Low risk by Wells criteria and PERC negative, no indication for D-dimer or CT.  Patient says that these symptoms are consistent with prior exercise-induced asthma episodes.  We discussed further testing including EKG, chest x-ray, and labs.  Patient would like to defer this for now.  Patient will be given albuterol inhaler discharge      1. BARKER (dyspnea on exertion)    2. Mild intermittent asthma without complication           Brief HPI:     Luis Martínez is a 34 year old female who presents for evaluation of shortness of breath.    Brief Physical Exam: /74   Pulse 73   Temp 98.4  F (36.9  C) (Oral)   Resp 16   Wt 98.4 kg (217 lb)   LMP 08/19/2024 (Approximate)   SpO2 98%   BMI 37.25 kg/m    Physical Exam  Vitals and nursing note reviewed.   Constitutional:       Appearance: Normal appearance.   HENT:      Head: Normocephalic and atraumatic.      Right Ear: External ear normal.      Left Ear: External ear normal.      Nose: Nose normal.       Mouth/Throat:      Mouth: Mucous membranes are moist.   Eyes:      Extraocular Movements: Extraocular movements intact.      Conjunctiva/sclera: Conjunctivae normal.      Pupils: Pupils are equal, round, and reactive to light.   Cardiovascular:      Rate and Rhythm: Normal rate and regular rhythm.   Pulmonary:      Effort: Pulmonary effort is normal.      Breath sounds: Normal breath sounds. No wheezing or rales.   Abdominal:      General: Abdomen is flat. There is no distension.      Palpations: Abdomen is soft.      Tenderness: There is no abdominal tenderness. There is no guarding.   Musculoskeletal:         General: Normal range of motion.      Cervical back: Normal range of motion and neck supple.      Right lower leg: No edema.      Left lower leg: No edema.   Lymphadenopathy:      Cervical: No cervical adenopathy.   Skin:     General: Skin is warm and dry.   Neurological:      General: No focal deficit present.      Mental Status: She is alert and oriented to person, place, and time. Mental status is at baseline.      Comments: No gross focal neurologic deficits   Psychiatric:         Mood and Affect: Mood normal.         Behavior: Behavior normal.         Thought Content: Thought content normal.       Joseph Nicholson M.D.  [facility] EMERGENCY ROOM  [address] [facility] [id number] [phone]     Joseph Nicholson MD  09/18/24 8341

## 2024-09-18 NOTE — DISCHARGE INSTRUCTIONS
You were seen in the emergency department today for mild asthma exacerbation.    You were given a prescription for an albuterol inhaler here in the ER, I recommend keeping this handy to use for any shortness of breath and wheezing.  You can also try taking a daily Zyrtec or Claritin to see if this helps with seasonal changes in your asthma.    Continue working on getting insurance so that you can establish with primary care for further management of your asthma.  Return to the emergency department if you develop any severe worsening shortness of breath or chest pain, wheezing, or high fevers

## 2024-09-18 NOTE — Clinical Note
Luis Martínez was seen and treated in our emergency department on 9/18/2024.  She may return to work on 09/18/2024.  Can return to work without restrictions     If you have any questions or concerns, please don't hesitate to call.      Huma Chris PA-C

## 2024-09-18 NOTE — ED TRIAGE NOTES
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  She began to have chest tightness.  She does have a hx of asthma.  Currently denies CP or SOB and states her director wanted her to be seen in ER.     Triage Assessment (Adult)       Row Name 09/18/24 1210          Triage Assessment    Airway WDL WDL        Respiratory WDL    Respiratory WDL WDL        Skin Circulation/Temperature WDL    Skin Circulation/Temperature WDL WDL        Cardiac WDL    Cardiac WDL WDL        Peripheral/Neurovascular WDL    Peripheral Neurovascular WDL WDL        Cognitive/Neuro/Behavioral WDL    Cognitive/Neuro/Behavioral WDL WDL

## 2024-09-18 NOTE — ED PROVIDER NOTES
Emergency Department Encounter   NAME: Luis Martínez ; AGE: 34 year old female ; YOB: 1990 ; MRN: [id number] ; PCP: No Ref-Primary, Physician   ED PROVIDER: Huma Chris PA-C    Evaluation Date & Time:   No admission date for patient encounter.    CHIEF COMPLAINT:  Chest Pain and Shortness of Breath        Impression and Plan   FINAL IMPRESSION:    ICD-10-CM    1. BARKER (dyspnea on exertion)  R06.09       2. Mild intermittent asthma without complication  J45.20           ED Course and Medical Decision Making  Luis is a 34 year old female with PMH of migraine headaches, moderate persistent asthma, and PCOS presenting to the ER for evaluation of an episode of chest pain and SOB. Patient states she does not have any symptoms currently. She states every time seasons change she gets increased wheezing and shortness of breath related to her asthma.  Patient works as a  and was playing outside with kids, when she came inside to get water she was a bit wheezy with shortness of breath.  She states her director saw her, who has never met her before and was very concerned, sending her to the emergency department.  Patient states she cannot return to work until she has a note saying she is cleared. She states this episode felt similar to her asthma exacerbations in the past, denies any fevers, cough, congestion, or sore throat.  Patient states she has not been seen by primary care for a few years and does not currently have a rescue albuterol inhaler.    Vitals reviewed and remarkable, she is afebrile temp 98.4 F.  She is not tachycardic or tachypneic.  SpO2 98% on room air.  On exam she is resting comfortably, no distress. Differential diagnosis includes but not limited to COVID, influenza, viral URI, asthma exacerbation, pneumonia, PE, ACS.  Her lung sounds are clear to auscultation throughout, no wheezing, rhonchi, or stridor.  No accessory muscle use or increased work of breathing.   Patient is speaking in full sentences without difficulty.  She is completely asymptomatic at this time and does not to appear to be having an acute asthma exacerbation.  Do not think there is indication for steroids or magnesium. Patient states the primary reason she is here today is because her boss made her come, she has not personally concerned that the episode that happened earlier today is anything but her asthma. Given she has dyspnea on exertion I did offer further cardiac workup today.  Patient is fairly confident that the symptoms she experienced earlier today are due to her asthma.  Utilizing shared decision making, decided to not proceed with further cardiac workup today.  I think this is reasonable given her benign exam, stable vitals, and is otherwise low risk for cardiac disease.  She is PERC negative and I have low suspicion for PE as a cause for her transient shortness of breath and chest tightness.    She was given 2 puffs of albuterol here in the ER.  Given her symptoms seem to be exacerbated by season changes I recommended she try daily Zyrtec seasonally to see if this helps.  I have also given her a refill of her albuterol inhaler.  Patient states she is currently in the process of getting insurance and will establish primary care to continue management of her asthma.  She was educated on concerning symptoms of warrant return to the emergency department and is understanding and agreeable to this plan.        History:  Obtained supplemental history:Supplemental history obtained?: No  Reviewed external records: External records reviewed?: No    Complicating Factors:  Care impacted by chronic illness:N/A  Care significantly affected by social determinants of health:Access to Medical Care    Work Up:  Did you consider but not order tests?: In addition to work-up documented, I considered the following work up: EKG, troponin, CXR, CBC, BMP, TSH, COVID swab  Did you interpret images independently?:  Independent interpretation of ECG and images noted in documentation, when applicable.    External Consults:  Consultation discussion with other provider: Dr. Nicholson  Discharge. I prescribed additional prescription strength medication(s) as charted. See documentation for any additional details.        ED COURSE:  12:10 PM I met and introduced myself to the patient. I gathered initial history and performed my physical exam. We discussed plan for initial workup.   12:17 PM I have staffed the patient with Dr. Nicholson, ED MD, who has evaluated the patient and agrees with all aspects of today's care.   12:30 PM I rechecked the patient and discussed results, discharge, follow up, and reasons to return to the ED.     At the conclusion of the encounter I discussed the results of all the tests and the disposition. The questions were answered. The patient or family acknowledged understanding and was agreeable with the care plan.        MEDICATIONS GIVEN IN THE EMERGENCY DEPARTMENT:  Medications   albuterol (PROVENTIL HFA/VENTOLIN HFA) inhaler (has no administration in time range)         NEW PRESCRIPTIONS STARTED AT TODAY'S ED VISIT:  New Prescriptions    ALBUTEROL (PROAIR HFA/PROVENTIL HFA/VENTOLIN HFA) 108 (90 BASE) MCG/ACT INHALER    Inhale 2 puffs into the lungs every 6 hours as needed for shortness of breath, wheezing or cough.         HPI   Patient information was obtained from: patient  Use of Intrepreter: N/A     Luis is a 34 year old female with PMH of migraine headaches, moderate persistent asthma, and PCOS presenting to the ER for evaluation of an episode of chest pain and SOB. Patient states she does not have any symptoms currently. She states every time seasons change she gets increased wheezing and shortness of breath related to her asthma.  Patient works as a  and was playing outside with kids, when she came inside to get water she was a bit wheezy with shortness of breath.  She states  her director saw her, who has never met her before and was very concerned, sending her to the emergency department.  Patient states she cannot return to work until she has a note saying she is cleared. She states this episode felt similar to her asthma exacerbations in the past, denies any fevers, cough, congestion, or sore throat.  Patient states she has not been seen by primary care for a few years and does not currently have a rescue albuterol inhaler.        Medical History     Past Medical History:   Diagnosis Date    Uncomplicated asthma        No past surgical history on file.    Family History   Problem Relation Age of Onset    No Known Problems Mother     No Known Problems Father     No Known Problems Brother     Diabetes Maternal Grandmother     Heart Disease Maternal Grandmother     Hypertension Maternal Grandmother     Cerebrovascular Disease Paternal Grandmother        Social History     Tobacco Use    Smoking status: Never    Smokeless tobacco: Never   Substance Use Topics    Alcohol use: Yes     Comment: Alcoholic Drinks/day: occ      Drug use: No       albuterol (PROAIR HFA/PROVENTIL HFA/VENTOLIN HFA) 108 (90 Base) MCG/ACT inhaler  albuterol (PROAIR HFA/PROVENTIL HFA/VENTOLIN HFA) 108 (90 Base) MCG/ACT inhaler  beclomethasone (QVAR) 40 mcg/actuation inhaler  fluticasone-salmeterol (ADVAIR) 100-50 mcg/dose DISKUS  ibuprofen (ADVIL,MOTRIN) 600 MG tablet  ipratropium - albuterol 0.5 mg/2.5 mg/3 mL (DUONEB) 0.5-2.5 (3) MG/3ML neb solution  predniSONE (DELTASONE) 20 MG tablet          Physical Exam     First Vitals:  Patient Vitals for the past 24 hrs:   BP Temp Temp src Pulse Resp SpO2 Weight   09/18/24 1209 131/74 98.4  F (36.9  C) Oral 73 16 98 % 98.4 kg (217 lb)         PHYSICAL EXAM    General Appearance:  Alert, cooperative, no distress, appears stated age  HENT: Normocephalic without obvious deformity, atraumatic. Mucous membranes moist   Eyes: Conjunctiva clear, Lids normal. No discharge.    Respiratory: No distress. Lungs clear to ausculation bilaterally. No wheezes, rhonchi or stridor  Cardiovascular: Regular rate and rhythm, no murmur. Normal cap refill. No peripheral edema  GI: Abdomen soft, nontender, normal bowel sounds  : No CVA tenderness  Musculoskeletal: Moving all extremities. No gross deformities  Integument: Warm, dry, no rashes or lesions  Neurologic: Alert and orientated x3. No focal deficits.  Psych: Normal mood and affect        Results     LAB:  All pertinent labs reviewed and interpreted  Labs Ordered and Resulted from Time of ED Arrival to Time of ED Departure - No data to display    RADIOLOGY:  No orders to display         ECG:  N/A      PROCEDURES:  N/A      Huma Chris PA-C   Emergency Medicine   [facility] EMERGENCY ROOM       Huma Chris PA-C  09/18/24 1957

## 2025-02-20 ENCOUNTER — APPOINTMENT (OUTPATIENT)
Dept: RADIOLOGY | Facility: CLINIC | Age: 35
End: 2025-02-20
Attending: PHYSICIAN ASSISTANT

## 2025-02-20 ENCOUNTER — HOSPITAL ENCOUNTER (EMERGENCY)
Facility: CLINIC | Age: 35
Discharge: HOME | End: 2025-02-20
Admitting: EMERGENCY MEDICINE

## 2025-02-20 VITALS
TEMPERATURE: 98.2 F | HEART RATE: 60 BPM | SYSTOLIC BLOOD PRESSURE: 121 MMHG | DIASTOLIC BLOOD PRESSURE: 61 MMHG | BODY MASS INDEX: 36.88 KG/M2 | HEIGHT: 64 IN | WEIGHT: 216 LBS | OXYGEN SATURATION: 100 % | RESPIRATION RATE: 16 BRPM

## 2025-02-20 DIAGNOSIS — M25.572 ACUTE LEFT ANKLE PAIN: ICD-10-CM

## 2025-02-20 PROBLEM — A54.9 GONORRHEA: Status: ACTIVE | Noted: 2018-09-05

## 2025-02-20 PROBLEM — J45.40 MODERATE PERSISTENT ASTHMA: Status: ACTIVE | Noted: 2025-02-20

## 2025-02-20 PROCEDURE — 73630 X-RAY EXAM OF FOOT: CPT | Mod: LT

## 2025-02-20 PROCEDURE — 99284 EMERGENCY DEPT VISIT MOD MDM: CPT

## 2025-02-20 PROCEDURE — 73610 X-RAY EXAM OF ANKLE: CPT | Mod: LT

## 2025-02-20 ASSESSMENT — COLUMBIA-SUICIDE SEVERITY RATING SCALE - C-SSRS
1. IN THE PAST MONTH, HAVE YOU WISHED YOU WERE DEAD OR WISHED YOU COULD GO TO SLEEP AND NOT WAKE UP?: NO
2. HAVE YOU ACTUALLY HAD ANY THOUGHTS OF KILLING YOURSELF IN THE PAST MONTH?: NO
6. HAVE YOU EVER DONE ANYTHING, STARTED TO DO ANYTHING, OR PREPARED TO DO ANYTHING TO END YOUR LIFE?: NO

## 2025-02-20 ASSESSMENT — ACTIVITIES OF DAILY LIVING (ADL): ADLS_ACUITY_SCORE: 41

## 2025-02-20 NOTE — ED TRIAGE NOTES
While walking down the stairs yesterday missed a step and twisted her left foot and ankle.  CMS intact.  Ice applied.     Triage Assessment (Adult)       Row Name 02/20/25 0940          Triage Assessment    Airway WDL WDL        Respiratory WDL    Respiratory WDL WDL        Skin Circulation/Temperature WDL    Skin Circulation/Temperature WDL WDL        Cardiac WDL    Cardiac WDL WDL        Peripheral/Neurovascular WDL    Peripheral Neurovascular WDL WDL     Capillary Refill, General less than/equal to 3 secs        [place] Coma Scale    Best Eye Response 4-->(E4) spontaneous     Best Motor Response 6-->(M6) obeys commands     Best Verbal Response 5-->(V5) oriented     [place] Coma Scale Score 15

## 2025-02-20 NOTE — Clinical Note
Luis Martínez was seen and treated in our emergency department on 2/20/2025.  She may return to work on 02/24/2025.       If you have any questions or concerns, please don't hesitate to call.      Frannie Mckinney PA-C

## 2025-02-20 NOTE — DISCHARGE INSTRUCTIONS
Your emergency department evaluation today is reassuring.  Your x-rays were negative for any sign of fracture.  Please use the Aircast and crutches as needed for ankle stability until you feel that you can walk without significant pain. Please take ibuprofen or Tylenol as needed for pain and follow-up with your primary care provider if pain persists.

## 2025-02-20 NOTE — ED PROVIDER NOTES
Emergency Department Encounter   NAME: Luis Martínez ; AGE: 35 year old female ; YOB: 1990 ; MRN: [id number] ; PCP: No Ref-Primary, Physician   ED PROVIDER: Frannie Mckinney PA-C, Izabella Hunter PA-C    Evaluation Date & Time:   No admission date for patient encounter.    CHIEF COMPLAINT:  Foot Injury      Impression and Plan   MDM: Luis Martínez is a 35 year old female who presents to the ED for evaluation of left ankle and foot pain.  Patient was walking down the stairs yesterday when she missed a step and twisted her left ankle.  Since then she has tried icing and elevating the ankle and has not used anything for pain management.  Today she woke up and found she could barely put any weight on it due to the pain.  She denies any numbness or tingling and is able to fully move her left ankle.  She did not hit her head with this fall and denies any other injuries.  No knee pain.    Vitals reviewed and are notable only for hypertension, which may be related to her pain. On exam patient does have pain to palpation of her left ankle, but she denies any numbness or tingling and is able to actively move her ankle in all directions, only limited by pain.  No significant swelling is noted.  Pedal pulses palpable.  No knee pain with palpation and no obvious deformity of ankle noted.  Anterior posterior drawer test negative for any pain.    I discussed with the patient that her x-rays were negative for any sign of fracture in her left ankle or foot.  This is most likely a sprain, which will improve with time and supportive care.  I suggested that she continue with the elevating and icing of her ankle and she take ibuprofen and Tylenol as needed for pain.  She works as a  and is constantly on her feet for work, so she requested a note for work today.  Patient discharged with Aircast and crutches to use at home.      ED COURSE:  10:37 AM I met and introduced myself to the patient. I gathered  initial history and performed my physical exam. We discussed plan for initial workup.     10:46 AM I rechecked the patient and discussed results, discharge, follow up, and reasons to return to the ED.     At the conclusion of the encounter I discussed the results of all the tests and the disposition. The questions were answered. The patient or family acknowledged understanding and was agreeable with the care plan.    Medical Decision Making  Obtained supplemental history:Supplemental history obtained?: No  Reviewed external records: External records reviewed?: No  Care impacted by chronic illness:Documented in Chart  Did you consider but not order tests?: Work up considered but not performed and documented in chart, if applicable  Did you interpret images independently?: My preliminary independent interpretation of images are no fracture identified in left ankle or foot x-ray.  See radiology notes for final report.  Consultation discussion with other provider:Did you involve another provider (consultant, MH, pharmacy, etc.)?: No  Discharge. No recommendations on prescription strength medication(s). See documentation for any additional details.    MIPS: Not Applicable    FINAL IMPRESSION:    ICD-10-CM    1. Acute left ankle pain  M25.572             MEDICATIONS GIVEN IN THE EMERGENCY DEPARTMENT:  Medications - No data to display      NEW PRESCRIPTIONS STARTED AT TODAY'S ED VISIT:  Discharge Medication List as of 2/20/2025 11:06 AM            HPI   Use of Intrepreter: N/A     Luis NEGRO Mauricio is a 35 year old female with a pertinent history of severe obesity who presents to the ED for evaluation of left ankle pain.  Patient was walking down the stairs yesterday when she missed a step and twisted her left ankle.  Since then she has tried icing and elevating the ankle and has not used anything for pain management.  Today she woke up and found she could barely put any weight on it due to the pain.  She denies any  "numbness or tingling and is able to fully move her left ankle.  She did not hit her head with this fall, is not on blood thinners, and denies any other injuries.  No knee pain.      REVIEW OF SYSTEMS:  Pertinent positive and negative symptoms per HPI.       Medical History     Past Medical History:   Diagnosis Date    Uncomplicated asthma        History reviewed. No pertinent surgical history.    Family History   Problem Relation Age of Onset    No Known Problems Mother     No Known Problems Father     No Known Problems Brother     Diabetes Maternal Grandmother     Heart Disease Maternal Grandmother     Hypertension Maternal Grandmother     Cerebrovascular Disease Paternal Grandmother        Social History     Tobacco Use    Smoking status: Never    Smokeless tobacco: Never   Substance Use Topics    Alcohol use: Yes     Comment: Alcoholic Drinks/day: occ      Drug use: No       albuterol (PROAIR HFA/PROVENTIL HFA/VENTOLIN HFA) 108 (90 Base) MCG/ACT inhaler  albuterol (PROAIR HFA/PROVENTIL HFA/VENTOLIN HFA) 108 (90 Base) MCG/ACT inhaler  beclomethasone (QVAR) 40 mcg/actuation inhaler  fluticasone-salmeterol (ADVAIR) 100-50 mcg/dose DISKUS  ibuprofen (ADVIL,MOTRIN) 600 MG tablet  ipratropium - albuterol 0.5 mg/2.5 mg/3 mL (DUONEB) 0.5-2.5 (3) MG/3ML neb solution  predniSONE (DELTASONE) 20 MG tablet          Physical Exam     First Vitals:  Patient Vitals for the past 24 hrs:   BP Temp Pulse Resp SpO2 Height Weight   02/20/25 1113 121/61 -- 60 16 100 % -- --   02/20/25 0937 (!) 159/69 98.2  F (36.8  C) 62 16 100 % 1.626 m (5' 4\") 98 kg (216 lb)         PHYSICAL EXAM:   Physical Exam  Constitutional:       General: She is not in acute distress.     Appearance: Normal appearance. She is not diaphoretic.   HENT:      Head: Atraumatic.      Mouth/Throat:      Mouth: Mucous membranes are moist.   Eyes:      General: No scleral icterus.     Conjunctiva/sclera: Conjunctivae normal.   Cardiovascular:      Rate and Rhythm: " Normal rate.      Pulses: Normal pulses.      Heart sounds: Normal heart sounds.   Pulmonary:      Effort: No respiratory distress.      Breath sounds: Normal breath sounds.   Abdominal:      General: Abdomen is flat.      Tenderness: There is no abdominal tenderness.   Musculoskeletal:         General: Tenderness (tenderness to left ankle palpation) present.      Cervical back: Neck supple.      Comments: Negative anterior/posterior drawer test of left ankle  No major swelling noted   Patient able to actively move her ankle/ foot but is limited by pain   Skin:     General: Skin is warm.      Findings: No rash.   Neurological:      General: No focal deficit present.      Mental Status: She is alert and oriented to person, place, and time.      Sensory: No sensory deficit.      Comments: Denies numbness of left foot/toes   Psychiatric:         Mood and Affect: Mood normal.             Results     LAB:  All pertinent labs reviewed and interpreted  Labs Ordered and Resulted from Time of ED Arrival to Time of ED Departure - No data to display    RADIOLOGY:  I have personally reviewed x-ray foot and ankle, no notable fractures. Please see formal radiology report.   XR Ankle Left G/E 3 Views   Final Result   IMPRESSION: Normal joint spaces and alignment. No acute foot or ankle fracture. The ankle mortise is congruent. The talar dome is intact.      Foot XR, G/E 3 views, left   Final Result   IMPRESSION: Normal joint spaces and alignment. No acute foot or ankle fracture. The ankle mortise is congruent. The talar dome is intact.          PROCEDURES:  none      Frannie Mckinney PA-C   Emergency Medicine   [facility] EMERGENCY ROOM        Izabella Hunter PA-C  02/20/25 [id number]

## 2025-02-20 NOTE — ED PROVIDER NOTES
EMERGENCY DEPARTMENT ENCOUNTER      NAME: Luis Martínez  AGE: 35 year old female  YOB: 1990  MRN: [id number]  EVALUATION DATE & TIME: No admission date for patient encounter.    PCP: No Ref-Primary, Physician    ED PROVIDER: Izabella Hunter PA-C      Chief Complaint   Patient presents with    Foot Injury         FINAL IMPRESSION:  No diagnosis found.      ED COURSE & MEDICAL DECISION MAKING:    10:15 AM I introduced myself to patient, performed initial HPI and examination.   10:26 AM I updated the patient on X-ray results. We discussed the plan for discharge and the patient is agreeable. Reviewed supportive cares, symptomatic treatment, outpatient follow up, and reasons to return to the Emergency Department. All questions and concerns were addressed. Patient to be discharged by ED RN.       35 year old female with PMH *** presents to the Emergency Department for evaluation of ***.    Differential includes ***    VSS, afebrile***  Exam ***  EKG ***nonischemic***  Labs with no*** leukocytosis, no anemia. No notable electrolyte derangement***. Creatinine is WNL***. LFT ***. Lipase ***. UA ***.   CXR ***  CT ***  Ultrasound ***    Presentation is most consistent with ***.       Discussed all results with patient. Instructed on at home management, *** close follow up with PCP***, *** and red flags/indications to return to the emergency department. All questions were answered to the best of my ability and patient is agreeable with plan.***    Given ***, plan to admit for further evaluation and management. ***. I spoke with  ***, hospitalist, who ***accepts admission***.     Critical Care Time: *** minutes spent in critical care of this patient including, but not limited to: patient evaluation and re-evaluation, lab interpretation, EKG interpretation, radiology review, discussion with consultants, and documentation.    Medical Decision Making  Obtained supplemental history:Supplemental history  "obtained?: No  Reviewed external records: External records reviewed?: Documented in chart and Outpatient Record: [facility] ED on 9/18/2024 for chest pain and shortness of breath  Care impacted by chronic illness:Documented in Chart and Other: Morbid obesity   Care significantly affected by social determinants of health:N/A  Did you consider but not order tests?: Work up considered but not performed and documented in chart, if applicable  Did you interpret images independently?: Independent interpretation of ECG and images noted in documentation, when applicable.  Consultation discussion with other provider:Did you involve another provider (consultant, , pharmacy, etc.)?: No  Discharge. {zvprescriptionmeds:191441}. {zvadmissionconsidered:467802::\"See documentation for any additional details\"}.  {[facility] DOCUMENTATION:548883}        MEDICATIONS GIVEN IN THE EMERGENCY:  Medications - No data to display    NEW PRESCRIPTIONS STARTED AT TODAY'S ER VISIT  New Prescriptions    No medications on file          =================================================================    HPI    Patient information was obtained from: the patient     Use of : N/A        Luis Martínez is a 35 year old female with a pertinent history of moderate persistent asthma and morbid obesity who presents to this ED by private car for evaluation of foot injury.     The patient reports she was walking down the stairs yesterday when she missed a step and twisted her left ankle. The patient states she caught herself and did not fall or hit her head. She applied ice to her ankle and kept it elevated. While walking this morning, the pain in her left ankle persisted. No knee pain. No pain medication taken yet. She denies any other injuries.     The patient works as a . She denies any chances of pregnancy. The patient denies any other complaints at this time.       Per chart review, the patient presented to [facility] ED on " "9/18/2024 for chest pain and shortness of breath. Given 2 puffs of albuterol. Refilled her albuterol inhaler. Recommended she try daily Zyrtec seasonally given her symptoms seem to be exacerbated by season changes.       REVIEW OF SYSTEMS   ROS negative unless otherwise stated in HPI    PAST MEDICAL HISTORY:  Past Medical History:   Diagnosis Date    Uncomplicated asthma        PAST SURGICAL HISTORY:  History reviewed. No pertinent surgical history.    CURRENT MEDICATIONS:    albuterol (PROAIR HFA/PROVENTIL HFA/VENTOLIN HFA) 108 (90 Base) MCG/ACT inhaler  albuterol (PROAIR HFA/PROVENTIL HFA/VENTOLIN HFA) 108 (90 Base) MCG/ACT inhaler  beclomethasone (QVAR) 40 mcg/actuation inhaler  fluticasone-salmeterol (ADVAIR) 100-50 mcg/dose DISKUS  ibuprofen (ADVIL,MOTRIN) 600 MG tablet  ipratropium - albuterol 0.5 mg/2.5 mg/3 mL (DUONEB) 0.5-2.5 (3) MG/3ML neb solution  predniSONE (DELTASONE) 20 MG tablet        ALLERGIES:  No Known Allergies    FAMILY HISTORY:  Family History   Problem Relation Age of Onset    No Known Problems Mother     No Known Problems Father     No Known Problems Brother     Diabetes Maternal Grandmother     Heart Disease Maternal Grandmother     Hypertension Maternal Grandmother     Cerebrovascular Disease Paternal Grandmother        SOCIAL HISTORY:   Social History     Socioeconomic History    Marital status: Single   Tobacco Use    Smoking status: Never    Smokeless tobacco: Never   Substance and Sexual Activity    Alcohol use: Yes     Comment: Alcoholic Drinks/day: occ      Drug use: No    Sexual activity: Yes     Partners: Male       VITALS:  BP (!) 159/69   Pulse 62   Temp 98.2  F (36.8  C)   Resp 16   Ht 1.626 m (5' 4\")   Wt 98 kg (216 lb)   LMP 02/20/2025   SpO2 100%   BMI 37.08 kg/m      PHYSICAL EXAM    Constitutional: Well developed, Well nourished, NAD, GCS ***   HENT: Normocephalic, Atraumatic, Oropharynx clear***.   Neck- Supple, Nontender. Normal ROM. No stridor.***  Eyes: " Conjunctiva normal. PERRL. EOM intact. ***  Respiratory: No respiratory distress, speaking in full sentences. Normal breath sounds, No wheezing, No cough ***  Cardiovascular: Normal heart rate, Regular rhythm, *** No murmurs. Chest wall nontender. ***   GI: Soft, nontender***. No distention or masses***. No CVA tenderness***.   Rectal: ***  : Chaperone ***   Musculoskeletal: No deformities, Moves all extremities equally***. No calf tenderness or swelling.***  Integument: Warm, Dry, No erythema, ecchymosis, or rash.***  Neurologic: Alert & oriented x 3, Normal sensory function. No focal deficits***.   Psychiatric: Affect normal, Judgment normal, Mood normal. Cooperative. ***     LAB:  All pertinent labs reviewed and interpreted.  Results for orders placed or performed during the hospital encounter of 02/20/25   Foot XR, G/E 3 views, left    Impression    IMPRESSION: Normal joint spaces and alignment. No acute foot or ankle fracture. The ankle mortise is congruent. The talar dome is intact.   XR Ankle Left G/E 3 Views    Impression    IMPRESSION: Normal joint spaces and alignment. No acute foot or ankle fracture. The ankle mortise is congruent. The talar dome is intact.       RADIOLOGY:  Reviewed all pertinent imaging. Please see official radiology report.  XR Ankle Left G/E 3 Views   Final Result   IMPRESSION: Normal joint spaces and alignment. No acute foot or ankle fracture. The ankle mortise is congruent. The talar dome is intact.      Foot XR, G/E 3 views, left   Final Result   IMPRESSION: Normal joint spaces and alignment. No acute foot or ankle fracture. The ankle mortise is congruent. The talar dome is intact.              I, Benny Lopez, am serving as a scribe to document services personally performed by Izabella Hunter PA-C based on my observation and the provider's statements to me. IIzabella PA-C, attest that Benny Lopez is acting in a scribe capacity, has observed my performance of the  services and has documented them in accordance with my direction.    Izabella Hunter PA-C  Emergency Medicine  [facility] EMERGENCY ROOM  [address] [id number] [phone]